# Patient Record
Sex: FEMALE | Race: WHITE | NOT HISPANIC OR LATINO
[De-identification: names, ages, dates, MRNs, and addresses within clinical notes are randomized per-mention and may not be internally consistent; named-entity substitution may affect disease eponyms.]

---

## 2019-01-15 ENCOUNTER — APPOINTMENT (OUTPATIENT)
Dept: OTOLARYNGOLOGY | Facility: CLINIC | Age: 19
End: 2019-01-15
Payer: COMMERCIAL

## 2019-01-15 DIAGNOSIS — Q27.9 CONGENITAL MALFORMATION OF PERIPHERAL VASCULAR SYSTEM, UNSPECIFIED: ICD-10-CM

## 2019-01-15 PROBLEM — Z00.00 ENCOUNTER FOR PREVENTIVE HEALTH EXAMINATION: Status: ACTIVE | Noted: 2019-01-15

## 2019-01-15 PROCEDURE — 31575 DIAGNOSTIC LARYNGOSCOPY: CPT

## 2019-01-15 PROCEDURE — 99205 OFFICE O/P NEW HI 60 MIN: CPT | Mod: 25

## 2019-01-15 RX ORDER — CARBAMAZEPINE 200 MG/1
200 TABLET ORAL
Qty: 60 | Refills: 0 | Status: ACTIVE | COMMUNITY
Start: 2018-11-27

## 2019-01-15 RX ORDER — OXYCODONE AND ACETAMINOPHEN 5; 325 MG/1; MG/1
5-325 TABLET ORAL
Qty: 15 | Refills: 0 | Status: ACTIVE | COMMUNITY
Start: 2019-01-15 | End: 1900-01-01

## 2019-01-15 RX ORDER — MIRTAZAPINE 15 MG/1
15 TABLET, FILM COATED ORAL
Qty: 30 | Refills: 0 | Status: ACTIVE | COMMUNITY
Start: 2019-01-02

## 2019-01-15 RX ORDER — AMOXICILLIN AND CLAVULANATE POTASSIUM 875; 125 MG/1; MG/1
875-125 TABLET, COATED ORAL
Qty: 20 | Refills: 0 | Status: ACTIVE | COMMUNITY
Start: 2018-11-03

## 2019-01-17 ENCOUNTER — APPOINTMENT (OUTPATIENT)
Dept: CT IMAGING | Facility: HOSPITAL | Age: 19
End: 2019-01-17

## 2019-02-25 NOTE — ASSESSMENT
[FreeTextEntry1] : Ms. 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 The patient is experiencing severe pain in the distribution of her venous malformation, localized to areas surrounding pheboliths. Pt was advised to take anti-inflammatory medication such as advil or motrin x 7 days. The pt was also given percocet for acute control of severe pain. Most likely, the phleboliths are the source of her pain.\par \par The pt does have a recent MRI, and she will mail the imaging to the office at her earliest convenience. Additional imaging (CT w/o contrast) was offered to the patient at this time, which pt would like to defer. The pt will have laboratory studies completed, inlcuding CBC, CMP, PT/PTT, d-dimer, fibrinogen and fibrin split products. \par \par Multiple modalities of treatment was offered to the patient including Nd:YAG laser treatment and surgical excision. It is imperative to treat her airway venous lesions. The risks, benefits, and alternatives were discussed at length with the patient. She will consider her options and send her MRI for review. All questions answered.

## 2019-02-25 NOTE — HISTORY OF PRESENT ILLNESS
[de-identified] : Ms. Mcmullen is a 18 year old female with an extensive cervicofacial vascular malformation. \par She states she had right neck pain for 1 day which then resolved. This morning, it returned. 7/10 pain. constant. no meds were taken.\par She has had laser in the past, maybe sclero. \par She has also had approx 5-7 surgeries. \par Past h/o nec fasciitis\par Denies fevers/vomiting, +ve chills/nausea.\par

## 2019-02-25 NOTE — CONSULT LETTER
[FreeTextEntry1] : Dear Dr. none ,\par \par Thank you very much for referring your patient, Ms. DAVONTE FELIZ  to my office today. Following you will find the complete H&P from today's visit.\par \par We will continue to keep you apprised of her care. If you should have any further questions or concerns please do not hesitate to call or write. \par \par Yours sincerely,\par \par \par Tamar BEE M.D., FACS\par

## 2019-12-02 ENCOUNTER — OUTPATIENT (OUTPATIENT)
Dept: INPATIENT UNIT | Facility: HOSPITAL | Age: 19
LOS: 1 days | End: 2019-12-02
Payer: SELF-PAY

## 2019-12-02 VITALS
SYSTOLIC BLOOD PRESSURE: 107 MMHG | RESPIRATION RATE: 16 BRPM | HEART RATE: 94 BPM | OXYGEN SATURATION: 100 % | WEIGHT: 138.01 LBS | DIASTOLIC BLOOD PRESSURE: 60 MMHG | HEIGHT: 61 IN | TEMPERATURE: 98 F

## 2019-12-02 DIAGNOSIS — Z98.890 OTHER SPECIFIED POSTPROCEDURAL STATES: Chronic | ICD-10-CM

## 2019-12-02 PROBLEM — Q27.9 CONGENITAL MALFORMATION OF PERIPHERAL VASCULAR SYSTEM, UNSPECIFIED: Chronic | Status: ACTIVE | Noted: 2019-11-29

## 2019-12-02 PROBLEM — H91.90 UNSPECIFIED HEARING LOSS, UNSPECIFIED EAR: Chronic | Status: ACTIVE | Noted: 2019-11-29

## 2019-12-02 PROBLEM — R56.9 UNSPECIFIED CONVULSIONS: Chronic | Status: ACTIVE | Noted: 2019-11-29

## 2019-12-02 RX ORDER — GABAPENTIN 400 MG/1
600 CAPSULE ORAL DAILY
Refills: 0 | Status: DISCONTINUED | OUTPATIENT
Start: 2019-12-02 | End: 2019-12-03

## 2019-12-02 RX ORDER — BLEOMYCIN SULFATE 30 UNIT
15 VIAL (EA) INJECTION ONCE
Refills: 0 | Status: DISCONTINUED | OUTPATIENT
Start: 2019-12-02 | End: 2019-12-03

## 2019-12-02 RX ORDER — ONDANSETRON 8 MG/1
4 TABLET, FILM COATED ORAL EVERY 6 HOURS
Refills: 0 | Status: DISCONTINUED | OUTPATIENT
Start: 2019-12-02 | End: 2019-12-03

## 2019-12-02 RX ORDER — CARBAMAZEPINE 200 MG
400 TABLET ORAL DAILY
Refills: 0 | Status: DISCONTINUED | OUTPATIENT
Start: 2019-12-02 | End: 2019-12-03

## 2019-12-02 RX ORDER — SODIUM CHLORIDE 9 MG/ML
1000 INJECTION, SOLUTION INTRAVENOUS
Refills: 0 | Status: DISCONTINUED | OUTPATIENT
Start: 2019-12-02 | End: 2019-12-03

## 2019-12-02 RX ORDER — BACITRACIN ZINC 500 UNIT/G
1 OINTMENT IN PACKET (EA) TOPICAL
Qty: 1 | Refills: 0
Start: 2019-12-02 | End: 2019-12-06

## 2019-12-02 RX ORDER — ACETAMINOPHEN 500 MG
650 TABLET ORAL EVERY 6 HOURS
Refills: 0 | Status: DISCONTINUED | OUTPATIENT
Start: 2019-12-02 | End: 2019-12-03

## 2019-12-02 RX ORDER — SODIUM CHLORIDE 9 MG/ML
500 INJECTION, SOLUTION INTRAVENOUS ONCE
Refills: 0 | Status: COMPLETED | OUTPATIENT
Start: 2019-12-02 | End: 2019-12-02

## 2019-12-02 RX ORDER — MIRTAZAPINE 45 MG/1
15 TABLET, ORALLY DISINTEGRATING ORAL DAILY
Refills: 0 | Status: DISCONTINUED | OUTPATIENT
Start: 2019-12-02 | End: 2019-12-03

## 2019-12-02 RX ORDER — DEXAMETHASONE 0.5 MG/5ML
8 ELIXIR ORAL EVERY 8 HOURS
Refills: 0 | Status: DISCONTINUED | OUTPATIENT
Start: 2019-12-02 | End: 2019-12-03

## 2019-12-02 RX ORDER — BACITRACIN ZINC 500 UNIT/G
1 OINTMENT IN PACKET (EA) TOPICAL
Refills: 0 | Status: DISCONTINUED | OUTPATIENT
Start: 2019-12-02 | End: 2019-12-03

## 2019-12-02 RX ORDER — OXYCODONE HYDROCHLORIDE 5 MG/1
5 TABLET ORAL EVERY 4 HOURS
Refills: 0 | Status: DISCONTINUED | OUTPATIENT
Start: 2019-12-02 | End: 2019-12-03

## 2019-12-02 RX ORDER — HYDROMORPHONE HYDROCHLORIDE 2 MG/ML
0.5 INJECTION INTRAMUSCULAR; INTRAVENOUS; SUBCUTANEOUS
Refills: 0 | Status: DISCONTINUED | OUTPATIENT
Start: 2019-12-02 | End: 2019-12-03

## 2019-12-02 RX ADMIN — OXYCODONE HYDROCHLORIDE 5 MILLIGRAM(S): 5 TABLET ORAL at 18:23

## 2019-12-02 RX ADMIN — Medication 8 MILLIGRAM(S): at 22:55

## 2019-12-02 RX ADMIN — SODIUM CHLORIDE 75 MILLILITER(S): 9 INJECTION, SOLUTION INTRAVENOUS at 17:14

## 2019-12-02 RX ADMIN — SODIUM CHLORIDE 1000 MILLILITER(S): 9 INJECTION, SOLUTION INTRAVENOUS at 17:23

## 2019-12-02 NOTE — BRIEF OPERATIVE NOTE - NSICDXBRIEFPROCEDURE_GEN_ALL_CORE_FT
PROCEDURES:  Microscopic direct laryngoscopy 02-Dec-2019 16:41:41  Melissa Rivas  YAG laser treatment 02-Dec-2019 16:41:31 upper airway, tongue, lip, face venous malformation Melissa Rivas

## 2019-12-02 NOTE — BRIEF OPERATIVE NOTE - OPERATION/FINDINGS
Direct laryngoscopy with good view. VM of right/left anterior most false cords and anterior commissure. YAG laser to right side only. YAG laser to b/l arytenoid VM. YAG laser to left pharyngeal wall and soft/hard palate VM. YAG to right anterior oral tongue tip. YAG laser to b/l lip wet mucosa. Gentle YAG laser to b/l dry lip and facial VM. C02 fraxel laser to right neck and chin scar.

## 2019-12-02 NOTE — PROGRESS NOTE ADULT - SUBJECTIVE AND OBJECTIVE BOX
ENT POSTOP CHECK NOTE    HPI: 19F with cerviofacial and airway VM with multiple prior treatments and resection now s/p direct laryngoscopy with YAG laser to upper airway, palate, pharyngeal, right anterior oral tongue and lip VM, gentle YAG to dry lip and fraxel C02 laser to chin and right neck scar 12/2.    Interval: seen and examined at bedside. Slightly hypotensive 90s/30s immediately postop but asymptomatic. responsive to 500cc bolus. Denies dyspnea, CP, dizziness. Moderate pain controlled with prn. No bleeding. tolerating water.     PE:  NAD, A&Ox3  NC/AT, EOMI, face symmetric  s/p laser to chin and R neck scar with expected erythema  visible cervicofacial and lip VM  no dyspnea, stridor or increased WOB, no retractions    felicia MAIER  CN II-XII grossly intact    A/P: 19F with cerviofacial and airway VM with multiple prior treatments and resection now s/p direct laryngoscopy with YAG laser to upper airway, palate, pharyngeal, right anterior oral tongue and lip VM, gentle YAG to dry lip and fraxel C02 laser to chin and right neck scar 12/2.    -prn pain  -prn nausea  -soft diet as tolerated  -IVF until tolerating PO  -home meds  -up ad hoang  -SCDs  -IS  -dex 8q8 x3 doses postop    Dispo: 23hr, SDU for airway monitoring     d/w attending who agree with plan above

## 2019-12-03 ENCOUNTER — TRANSCRIPTION ENCOUNTER (OUTPATIENT)
Age: 19
End: 2019-12-03

## 2019-12-03 VITALS — TEMPERATURE: 98 F

## 2019-12-03 PROCEDURE — 42808 EXCISE PHARYNX LESION: CPT

## 2019-12-03 PROCEDURE — 31525 DX LARYNGOSCOPY EXCL NB: CPT

## 2019-12-03 PROCEDURE — 40820 TREATMENT OF MOUTH LESION: CPT

## 2019-12-03 PROCEDURE — 17106 DSTR CUT VSC PRLF LES<10SQCM: CPT

## 2019-12-03 RX ORDER — BACITRACIN ZINC 500 UNIT/G
1 OINTMENT IN PACKET (EA) TOPICAL
Qty: 1 | Refills: 0
Start: 2019-12-03 | End: 2019-12-07

## 2019-12-03 RX ORDER — ONDANSETRON 8 MG/1
5 TABLET, FILM COATED ORAL
Qty: 240 | Refills: 0
Start: 2019-12-03 | End: 2019-12-09

## 2019-12-03 RX ORDER — OXYCODONE HYDROCHLORIDE 5 MG/1
5 TABLET ORAL
Qty: 240 | Refills: 0
Start: 2019-12-03 | End: 2019-12-09

## 2019-12-03 RX ADMIN — OXYCODONE HYDROCHLORIDE 5 MILLIGRAM(S): 5 TABLET ORAL at 10:54

## 2019-12-03 RX ADMIN — Medication 8 MILLIGRAM(S): at 06:15

## 2019-12-03 RX ADMIN — Medication 1 APPLICATION(S): at 06:15

## 2019-12-03 NOTE — PROGRESS NOTE ADULT - SUBJECTIVE AND OBJECTIVE BOX
HPI: 19F with cerviofacial and airway VM with multiple prior treatments and resection now s/p direct laryngoscopy with YAG laser to upper airway, palate, pharyngeal, right anterior oral tongue and lip VM, gentle YAG to dry lip and fraxel C02 laser to chin and right neck scar 12/2.    Interval: seen and examined at bedside. Slightly hypotensive 90s/30s immediately postop but asymptomatic. responsive to 500cc bolus. Denies dyspnea, CP, dizziness. Moderate pain controlled with prn. No bleeding. tolerating water.     12/3: NAEON. Tolerating diet. Pain controlled. No episodes of bleeding, SOB.     PE:  NAD, A&Ox3  NC/AT, EOMI, face symmetric  s/p laser to chin and R neck scar with expected erythema  visible cervicofacial and lip VM  no dyspnea, stridor or increased WOB, no retractions    MAIER, wwp  CN II-XII grossly intact    A/P: 19F with cerviofacial and airway VM with multiple prior treatments and resection now s/p direct laryngoscopy with YAG laser to upper airway, palate, pharyngeal, right anterior oral tongue and lip VM, gentle YAG to dry lip and fraxel C02 laser to chin and right neck scar 12/2.    -Plan for discharge home this AM, pending repeat fiberoptic laryngoscopy   -F/u w/ Dr. BEE as planned   -Medrol dosepak

## 2019-12-03 NOTE — DISCHARGE NOTE NURSING/CASE MANAGEMENT/SOCIAL WORK - PATIENT PORTAL LINK FT
You can access the FollowMyHealth Patient Portal offered by [facility] by registering at the following website: [email or web address]. By joining [facility]’s FollowMyHealth portal, you will also be able to view your health information using other applications (apps) compatible with our system.

## 2021-03-01 NOTE — ASU PATIENT PROFILE, ADULT - PATIENT REPRESENTATIVE PHONE
Patient appeared asleep through the night during 15min checks. She had no behavioral or medical concerns during the shift.   [phone]